# Patient Record
Sex: FEMALE | Race: ASIAN | HISPANIC OR LATINO | URBAN - METROPOLITAN AREA
[De-identification: names, ages, dates, MRNs, and addresses within clinical notes are randomized per-mention and may not be internally consistent; named-entity substitution may affect disease eponyms.]

---

## 2018-02-19 ENCOUNTER — EMERGENCY (EMERGENCY)
Facility: HOSPITAL | Age: 53
LOS: 1 days | Discharge: HOME | End: 2018-02-19
Attending: EMERGENCY MEDICINE
Payer: COMMERCIAL

## 2018-02-19 VITALS
DIASTOLIC BLOOD PRESSURE: 84 MMHG | HEART RATE: 77 BPM | OXYGEN SATURATION: 99 % | TEMPERATURE: 98 F | RESPIRATION RATE: 16 BRPM | SYSTOLIC BLOOD PRESSURE: 127 MMHG

## 2018-02-19 DIAGNOSIS — G56.00 CARPAL TUNNEL SYNDROME, UNSPECIFIED UPPER LIMB: Chronic | ICD-10-CM

## 2018-02-19 LAB
ANION GAP SERPL CALC-SCNC: 11 MMOL/L — SIGNIFICANT CHANGE UP (ref 5–17)
BUN SERPL-MCNC: 14 MG/DL — SIGNIFICANT CHANGE UP (ref 7–23)
CALCIUM SERPL-MCNC: 9.6 MG/DL — SIGNIFICANT CHANGE UP (ref 8.4–10.5)
CHLORIDE SERPL-SCNC: 103 MMOL/L — SIGNIFICANT CHANGE UP (ref 96–108)
CO2 SERPL-SCNC: 26 MMOL/L — SIGNIFICANT CHANGE UP (ref 22–31)
CREAT SERPL-MCNC: 0.55 MG/DL — SIGNIFICANT CHANGE UP (ref 0.5–1.3)
GLUCOSE SERPL-MCNC: 101 MG/DL — HIGH (ref 70–99)
POTASSIUM SERPL-MCNC: 4.2 MMOL/L — SIGNIFICANT CHANGE UP (ref 3.5–5.3)
POTASSIUM SERPL-SCNC: 4.2 MMOL/L — SIGNIFICANT CHANGE UP (ref 3.5–5.3)
SODIUM SERPL-SCNC: 140 MMOL/L — SIGNIFICANT CHANGE UP (ref 135–145)

## 2018-02-19 PROCEDURE — 99285 EMERGENCY DEPT VISIT HI MDM: CPT

## 2018-02-19 PROCEDURE — 70450 CT HEAD/BRAIN W/O DYE: CPT | Mod: 26

## 2018-02-19 NOTE — ED PROVIDER NOTE - MEDICAL DECISION MAKING DETAILS
51yo F w/ no significant pmh presents with headache x2 weeks. Giving benign PE and non emergent, would recommend an outpatient MRA however patient anxious and concerned about progressive headache. Will check creatinine, non con Head CT to check for acute bleed or mass effects. Considered CTA if head CT contributory.

## 2018-02-19 NOTE — ED ADULT TRIAGE NOTE - CHIEF COMPLAINT QUOTE
pt c/o h/a x few weeks with not much help with advil pt c/o h/a x few weeks with not much help with advil. pt denies any n/v/visual disturbances

## 2018-02-19 NOTE — ED PROVIDER NOTE - PROGRESS NOTE DETAILS
Sue BANKS: Patient reassessed and reports feeling better. Patient w/o neck stiffness or pain. Patient w/o ataxia and CT results discussed. Also EOMI and PERRL. Patient ready for D./C

## 2018-02-19 NOTE — ED ADULT NURSE NOTE - OBJECTIVE STATEMENT
53 y/o F presents to the ED c/o headache.  Pt states the HA has been going on for about a month now.  Pt states the HA comes and goes and is usually worse during morning and night.  PT states the HA is in the back of her head.  Pt also mentions she has some periods of "blurry vision". but states it is very minor.  Pt has been taking Advil for HA with minimal to no relief.  Patient is A&Ox4. Face is symmetrical. PERRL 3mmB. Speech is clear. Patient is moving all extremities with 5/5 strength and walks with steady gait. No chest pain, shortness of breath, diaphoresis, palpitations, left arm pain, excessive fatigue, or nausea/vomiting. VSS.

## 2018-02-19 NOTE — ED PROVIDER NOTE - OBJECTIVE STATEMENT
53yo F w/ no significant pmh presents with headache x2 weeks. Patient states that she noticed this dull throbbing headache localized to the occipital region. States that it's worse in the mornings and at night. Has tried alleve every 4hrs and tylenol which didn't help. Endorses occasional specks in periphery vision associated with cloudy vision NOT blurriness. No rhinorhrea, lacrimation, photophobia, phonophobia, N/V, fever, cough, neck stiffness, CP or SOB. 51yo F w/ no significant pmh presents with headache x2 weeks. Patient states that she noticed this dull throbbing headache localized to the occipital region. States that it's worse in the mornings and at night. Has tried alleve every 4hrs and tylenol which didn't help. Endorses occasional specks in periphery vision associated with cloudy vision NOT blurriness. No rhinorhrea, lacrimation, photophobia, phonophobia, N/V, fever, cough, neck stiffness, CP or SOB.    Of note, patient has a strong family history of aneurysm. Sister recently had a SAH and dad has an aneurysm.

## 2018-02-19 NOTE — ED PROVIDER NOTE - ATTENDING CONTRIBUTION TO CARE
Otherwise healthy 53 y/o female who presents with a cc of HA of at least 2 weeks duration. Gradual in its onset, it has been relatively persistent, throbbing, occipital s/ radiation, worse in the early AM and late PM and without clear relieving or exacerbating factor. She has expressed concern about aneurysm and SAH due to her recent FH of the same. On exam, she appears entirely well and very comfortable. VSs noted, head NC/AT, EOMsI s/ photophobia, neck supple, lungs CTA, cardiac sounds s/ audible m/r/g, abdomen soft, NT/ND, extremities s/ asymmetry, skin s/ rash and neurologically, she is completely intact. There is nothing clinically evident at this time to suggest any emergent process; i.e. ICH, increased ICP due to mass effect, central infection, vasculitis, central thromboembolic event, acute ophthalmologic issue etc. She is however very concerned about the potential of suffering from the same as her sister. Given this, we have elected to proceed with imaging to include CT/CTA. If negative, she is to be discharged. Necessary screening labs obtained.

## 2018-02-20 VITALS
DIASTOLIC BLOOD PRESSURE: 80 MMHG | RESPIRATION RATE: 18 BRPM | TEMPERATURE: 98 F | SYSTOLIC BLOOD PRESSURE: 122 MMHG | OXYGEN SATURATION: 100 % | HEART RATE: 70 BPM

## 2018-02-20 PROCEDURE — 70496 CT ANGIOGRAPHY HEAD: CPT

## 2018-02-20 PROCEDURE — 70496 CT ANGIOGRAPHY HEAD: CPT | Mod: 26

## 2018-02-20 PROCEDURE — 70450 CT HEAD/BRAIN W/O DYE: CPT

## 2018-02-20 PROCEDURE — 99284 EMERGENCY DEPT VISIT MOD MDM: CPT | Mod: 25

## 2018-02-20 PROCEDURE — 80048 BASIC METABOLIC PNL TOTAL CA: CPT

## 2021-12-24 ENCOUNTER — EMERGENCY (EMERGENCY)
Facility: HOSPITAL | Age: 56
LOS: 1 days | Discharge: HOME | End: 2021-12-24
Attending: EMERGENCY MEDICINE | Admitting: EMERGENCY MEDICINE
Payer: COMMERCIAL

## 2021-12-24 VITALS
TEMPERATURE: 98 F | OXYGEN SATURATION: 100 % | HEART RATE: 75 BPM | HEIGHT: 59 IN | RESPIRATION RATE: 17 BRPM | DIASTOLIC BLOOD PRESSURE: 75 MMHG | SYSTOLIC BLOOD PRESSURE: 113 MMHG

## 2021-12-24 DIAGNOSIS — G56.00 CARPAL TUNNEL SYNDROME, UNSPECIFIED UPPER LIMB: Chronic | ICD-10-CM

## 2021-12-24 LAB — SARS-COV-2 RNA SPEC QL NAA+PROBE: SIGNIFICANT CHANGE UP

## 2021-12-24 PROCEDURE — 99284 EMERGENCY DEPT VISIT MOD MDM: CPT

## 2021-12-24 RX ORDER — SODIUM CHLORIDE 9 MG/ML
1000 INJECTION, SOLUTION INTRAVENOUS ONCE
Refills: 0 | Status: COMPLETED | OUTPATIENT
Start: 2021-12-24 | End: 2021-12-24

## 2021-12-24 RX ORDER — KETOROLAC TROMETHAMINE 30 MG/ML
30 SYRINGE (ML) INJECTION ONCE
Refills: 0 | Status: DISCONTINUED | OUTPATIENT
Start: 2021-12-24 | End: 2021-12-24

## 2021-12-24 RX ORDER — GABAPENTIN 400 MG/1
1 CAPSULE ORAL
Qty: 21 | Refills: 0
Start: 2021-12-24 | End: 2021-12-30

## 2021-12-24 RX ORDER — DIPHENHYDRAMINE HCL 50 MG
25 CAPSULE ORAL ONCE
Refills: 0 | Status: DISCONTINUED | OUTPATIENT
Start: 2021-12-24 | End: 2021-12-24

## 2021-12-24 RX ORDER — ACETAMINOPHEN 500 MG
975 TABLET ORAL ONCE
Refills: 0 | Status: COMPLETED | OUTPATIENT
Start: 2021-12-24 | End: 2021-12-24

## 2021-12-24 RX ORDER — METOCLOPRAMIDE HCL 10 MG
10 TABLET ORAL ONCE
Refills: 0 | Status: COMPLETED | OUTPATIENT
Start: 2021-12-24 | End: 2021-12-24

## 2021-12-24 RX ORDER — GABAPENTIN 400 MG/1
300 CAPSULE ORAL ONCE
Refills: 0 | Status: COMPLETED | OUTPATIENT
Start: 2021-12-24 | End: 2021-12-24

## 2021-12-24 RX ADMIN — GABAPENTIN 300 MILLIGRAM(S): 400 CAPSULE ORAL at 15:24

## 2021-12-24 RX ADMIN — Medication 10 MILLIGRAM(S): at 14:57

## 2021-12-24 RX ADMIN — Medication 30 MILLIGRAM(S): at 14:58

## 2021-12-24 RX ADMIN — Medication 975 MILLIGRAM(S): at 14:58

## 2021-12-24 RX ADMIN — SODIUM CHLORIDE 1000 MILLILITER(S): 9 INJECTION, SOLUTION INTRAVENOUS at 14:58

## 2021-12-24 NOTE — ED PROVIDER NOTE - PROGRESS NOTE DETAILS
Alexis Johnson, PGY-2- HA has resolved. Pt sitting up, talking on phone. Feels well. Will give her Gabapentin for the tingling sensation, suspect neuropathy. Will f/u with PCP next week. Discussed results of work up with patient. Patient agrees with plan to discharge home. All questions answered regarding plan. Strict return precautions given.

## 2021-12-24 NOTE — ED PROVIDER NOTE - NSFOLLOWUPINSTRUCTIONS_ED_ALL_ED_FT
1) Follow up with your PCP within 1-3 days for evaluation fo symptoms  2) Return to the ED immediately for new or worsening symptoms severe pain, blurry vision, weakness, slurred speech, not acting like self, fever, neck stiffness   3) Please continue to take any home medications as prescribed  4) Your test results from your ED visit were discussed with you prior to discharge  5) You were provided with a copy of your test results  6) Please take Ibuprofen 600 mg every 6 hours and Tylenol 975 mg every 6 hours for pain. Please follow all pharmacy packaging instructions and warnings. Please take the Ibuprofen with food. Please take the Gabapentin as prescribed. Please refill the prescription with your PCP if they decide to keep you on it. Please follow all pharmacy packaging instructions and warnings.

## 2021-12-24 NOTE — ED PROVIDER NOTE - ATTENDING CONTRIBUTION TO CARE
DR. SY, ATTENDING MD-  I performed a face to face bedside interview with the patient regarding history of present illness, review of symptoms and past medical history. I completed an independent physical exam.  I have discussed the patient's plan of care with the resident.   Documentation as above in the note.    55 y/o female h/o hld with ha x3 days at right side of head.  Feels like tingling pain.  Worse with any contact to scalp.  On exam, no rash, supple neck, perrla, eomi, tenderness to light palpation over right scalp.  Allodynia and ha may be due to early shingles vs migraine.  Will tx symptomatically, no red flags for ha.  Likely dc with ha center referral for continued o/p care and evaluation.

## 2021-12-24 NOTE — ED PROVIDER NOTE - NSICDXFAMILYHX_GEN_ALL_CORE_FT
FAMILY HISTORY:  Sibling  Still living? Unknown  Family history of brain aneurysm, Age at diagnosis: Age Unknown

## 2021-12-24 NOTE — ED PROVIDER NOTE - NS ED ROS FT
General: no fever, no chills  Eyes: no vision changes, no eye pain  Cardiovascular: no chest pain, no edema  Respiratory: no cough, no shortness of breath  Gastrointestinal: no abdominal pain, no nausea, no vomiting, no diarrhea  Genitourinary: no dysuria, no hematuria  Musculoskeletal: no muscle pain, no joint pain  Skin: no rash, no lesions  Neuro: no numbness, +tingling, +HA  Psych: no depression, no anxiety

## 2021-12-24 NOTE — ED PROVIDER NOTE - NSICDXPASTMEDICALHX_GEN_ALL_CORE_FT
PAST MEDICAL HISTORY:  Carpal tunnel syndrome of right wrist     No pertinent past medical history     Other hyperlipidemia     Postmenopausal

## 2021-12-24 NOTE — ED PROVIDER NOTE - OBJECTIVE STATEMENT
56 year old female with no pmhx, presents to ED for evaluation of HA that began 2 days ago. Pt notes gradual onset, worsening intensity. Not relieved by Tylenol/Motrin at home. Last took anything last night. States pain feels like shooting and tingling on rt side of head radiating down neck. Notes it feels like there are bumps causing pain. Pain is intermittent in nature and lasts a few seconds at a time. No other symptoms. No fever, chills, cp, sob, nausea, vomiting, diarrhea, abd pain, weakness. No hx of shingles. Has intermittently gotten headaches throughout her life, does not follow with neuro. No daily meds. No tobacco, EtOH, or recreational drug use.

## 2021-12-24 NOTE — ED ADULT NURSE NOTE - NSIMPLEMENTINTERV_GEN_ALL_ED
Implemented All Universal Safety Interventions:  [place] to call system. Call bell, personal items and telephone within reach. Instruct patient to call for assistance. Room bathroom lighting operational. Non-slip footwear when patient is off stretcher. Physically safe environment: no spills, clutter or unnecessary equipment. Stretcher in lowest position, wheels locked, appropriate side rails in place.

## 2021-12-24 NOTE — ED PROVIDER NOTE - PATIENT PORTAL LINK FT
You can access the FollowMyHealth Patient Portal offered by [facility] by registering at the following website: [email or web address]. By joining "[email or web address]’s FollowMyHealth portal, you will also be able to view your health information using other applications (apps) compatible with our system.

## 2021-12-24 NOTE — ED PROVIDER NOTE - PHYSICAL EXAMINATION
General: well appearing, no acute distress, AOx3  Skin: no rash, no pallor, no visible rash/bumps beneath hair on scalp   Head: normocephalic, atraumatic  Eyes: clear conjunctiva, EOMI, PERRL   ENMT: airway patent, no nasal discharge  Cardiovascular: normal rate, normal rhythm, S1/S2  Pulmonary: clear to auscultation bilaterally, no rales, rhonchi, or wheeze  Abdomen: soft, nontender  Musculoskeletal: moving extremities well, no deformity, normal ROM of neck   Neuro: CN II-XII grossly intact, 5/5 strength b/l upper and lower extremities, negative Romberg, steady gait   Psych: normal mood, normal affect

## 2021-12-24 NOTE — ED PROVIDER NOTE - CLINICAL SUMMARY MEDICAL DECISION MAKING FREE TEXT BOX
Alexis Johnson, PGY-2- 56 year old female with no pmhx, here for rt sided headache, x2 days. Pt with intermittent rt sided tingling/burning on scalp. No visible rash. Vitals stable. Neuro exam WNL. Pt likely with early shingles vs migraine HA. Will treat pt with Reglan, Toradol, Tylenol, IVF, and reassess.